# Patient Record
Sex: MALE | HISPANIC OR LATINO | Employment: UNEMPLOYED | URBAN - METROPOLITAN AREA
[De-identification: names, ages, dates, MRNs, and addresses within clinical notes are randomized per-mention and may not be internally consistent; named-entity substitution may affect disease eponyms.]

---

## 2019-06-24 ENCOUNTER — OFFICE VISIT (OUTPATIENT)
Dept: FAMILY MEDICINE CLINIC | Facility: CLINIC | Age: 39
End: 2019-06-24

## 2019-06-24 VITALS
HEART RATE: 76 BPM | TEMPERATURE: 98.3 F | SYSTOLIC BLOOD PRESSURE: 140 MMHG | DIASTOLIC BLOOD PRESSURE: 90 MMHG | HEIGHT: 60 IN | WEIGHT: 183 LBS | BODY MASS INDEX: 35.93 KG/M2

## 2019-06-24 DIAGNOSIS — Z13.31 POSITIVE DEPRESSION SCREENING: ICD-10-CM

## 2019-06-24 DIAGNOSIS — F43.9 STRESS AT HOME: ICD-10-CM

## 2019-06-24 DIAGNOSIS — I10 HYPERTENSION, UNSPECIFIED TYPE: Primary | ICD-10-CM

## 2019-06-24 DIAGNOSIS — R07.9 CHEST PAIN, UNSPECIFIED TYPE: ICD-10-CM

## 2019-06-24 PROCEDURE — 99204 OFFICE O/P NEW MOD 45 MIN: CPT | Performed by: NURSE PRACTITIONER

## 2019-06-24 PROCEDURE — 93000 ELECTROCARDIOGRAM COMPLETE: CPT | Performed by: NURSE PRACTITIONER

## 2019-06-24 RX ORDER — CLONIDINE HYDROCHLORIDE 0.1 MG/1
0.1 TABLET ORAL ONCE
Status: SHIPPED | OUTPATIENT
Start: 2019-06-24

## 2019-06-24 RX ORDER — NITROGLYCERIN 2.5 MG/1
2.5 CAPSULE ORAL 3 TIMES DAILY
Qty: 30 CAPSULE | Refills: 3 | Status: SHIPPED | OUTPATIENT
Start: 2019-06-24

## 2019-06-24 RX ORDER — LISINOPRIL AND HYDROCHLOROTHIAZIDE 12.5; 1 MG/1; MG/1
1 TABLET ORAL DAILY
Qty: 30 TABLET | Refills: 5 | Status: SHIPPED | OUTPATIENT
Start: 2019-06-24

## 2019-06-24 NOTE — PATIENT INSTRUCTIONS
Hipertensión  Hypertension  El término hipertensión es otra forma de denominar a la presión arterial elevada. La presión arterial elevada fuerza al corazón a trabajar más para bombear la tone. [place] puede causar problemas con el paso del tiempo.  Simona lectura de presión arterial está compuesta por 2 números. Hay un número superior (sistólico) sobre un número inferior (diastólico). Lo ideal es tener la presión arterial por debajo de 120/80. Las decisiones saludables pueden ayudarle a disminuir van presión arterial. Es posible que necesite medicamentos que le ayuden a disminuir van presión arterial si:  · Van presión arterial no disminuye mediante decisiones saludables.  · Van presión arterial está por encima de 130/80.    Siga estas instrucciones en van casa:  Comida y bebida  · Si se lo indican, siga el plan de alimentación de DASH (Dietary Approaches to Stop Hypertension, Maneras de alimentarse para detener la hipertensión). Esta dieta incluye:  ? Que la mitad del plato de cada comida sea de frutas y verduras.  ? Que un cuarto del plato de cada comida sea de cereales integrales. Los cereales integrales incluyen pasta integral, arroz integral y pan integral.  ? Cicero y beber productos lácteos con bajo contenido de grasa, william leche descremada o yogur bajo en grasas.  ? Que un cuarto del plato de cada comida sea de proteínas bajas en grasa (magras). Las proteínas bajas en grasa incluyen pescado, vanesa sin piel, huevos, frijoles y tofu.  ? Evitar consumir carne grasa, carne curada y procesada, o vanesa con piel.  ? Evitar consumir alimentos prehechos o procesados.  · Consuma menos de 1500 mg de sal (sodio) por día.  · Limite el consumo de alcohol a no más de 1 medida por día si es ronda y no está embarazada y a 2 medidas por día si es hombre. Simona medida equivale a 12 onzas de cerveza, 5 onzas de vino o 1½ onzas de bebidas alcohólicas de annie graduación.  Estilo de anderson  · Trabaje con van médico para mantenerse en un peso  saludable o para perder peso. Pregúntele a van médico cuál es el peso recomendable para usted.  · Realice al menos 30 minutos de ejercicio que aníbal que se acelere van corazón (ejercicio aeróbico) la mayoría de los días de la semana. Estos pueden incluir caminar, nadar o andar en bicicleta.  · Realice al menos 30 minutos de ejercicio que fortalezca dara músculos (ejercicios de resistencia) al menos 3 días a la semana. Estos pueden incluir levantar pesas o hacer pilates.  · No consuma ningún producto que contenga nicotina o tabaco. [place] incluye cigarrillos y cigarrillos electrónicos. Si necesita ayuda para dejar de fumar, consulte al médico.  · Controle van presión arterial en van casa warren william le indicó el médico.  · Concurra a todas las visitas de control william se lo haya indicado el médico. [place] es importante.  Medicamentos  · [place] los medicamentos de venta kavya y los recetados solamente william se lo haya indicado el médico. Siga cuidadosamente las indicaciones.  · No omita las dosis de medicamentos para la presión arterial. Los medicamentos pierden eficacia si omite dosis. El hecho de omitir las dosis también aumenta el riesgo de otros problemas.  · Pregúntele a van médico a qué efectos secundarios o reacciones a los medicamentos debe prestar atención.  Comuníquese con un médico si:  · Piensa que tiene ricardo reacción a los medicamentos que está tomando.  · Tiene leland de kenyon frecuentes (recurrentes).  · Siente mareos.  · Tiene hinchazón en los tobillos.  · Tiene problemas de visión.  Solicite ayuda de inmediato si:  · Siente un dolor de kenyon muy intenso.  · Comienza a sentirse confundido.  · Se siente débil o adormecido.  · Siente que va a desmayarse.  · Siente un dolor muy intenso en:  ? El pecho.  ? El vientre (abdomen).  · Devuelve (vomita) más de ricardo vez.  · Tiene dificultad para respirar.  Resumen  · El término hipertensión es otra forma de denominar a la presión arterial elevada.  · Las decisiones saludables  pueden ayudarle a disminuir van presión arterial. Si no puede controlar van presión arterial mediante decisiones saludables, es posible que deba darlyn medicamentos.  Esta información no tiene william fin reemplazar el consejo del médico. Asegúrese de hacerle al médico cualquier pregunta que tenga.  Document Released: 06/07/2011 Document Revised: 11/29/2017 Document Reviewed: 11/29/2017  Elsevier Interactive Patient Education © 2019 [employer]

## 2019-06-24 NOTE — PROGRESS NOTES
Stefano Foote presents today to establish care.    Chief Complaint   Patient presents with   • Establish Care     New patient he comes in today to establish care and elavated blood pressure.        HPI   Stefano presents today to establish care.  Concerns of high blood pressure.   at bedside.    Hypertension  Chronic.  Worsening.  States that he was diagnosed with high blood pressure many years ago in [place].  Was taking medication for his blood pressure but then moved to the [facility] and has not had his medication for over a year.  Does not remember the name of this medication.  Does not have a way to check blood pressure at home.  Goes to [facility] to check blood pressure intermittently.  Blood pressure readings have been 200/120 at the highest, and 140/90 at the lowest which is his normal baseline without medications.  He can usually tell when his blood pressure is high; symptoms include chest pain, headache, and increased fatigue.    Chest pain  New.  Stable.  Has been having chest pain radiating to his left arm for over 1 week.  Describes as pressure that feels like a squeezing pain.  Associated symptoms include headache, pounding.  Rates pain 5/10, constant.  Relieved by rest and decreasing anxiety.    Positive depression screening  Denies any concerns of anxiety or depression.  States that he is under a lot of stress at home.  He is away from his family who are still in [place].  Is having to keep up with family relationships long distance, is not working currently, and has financial concerns.        PHQ-2/PHQ-9 Depression Screening 6/24/2019   Little interest or pleasure in doing things 3   Feeling down, depressed, or hopeless 3   Trouble falling or staying asleep, or sleeping too much 0   Feeling tired or having little energy 1   Poor appetite or overeating 2   Feeling bad about yourself - or that you are a failure or have let yourself or your family down 1   Trouble concentrating on things, such as  reading the newspaper or watching television 1   Moving or speaking so slowly that other people could have noticed. Or the opposite - being so fidgety or restless that you have been moving around a lot more than usual 2   Thoughts that you would be better off dead, or of hurting yourself in some way 0   Total Score 13   If you checked off any problems, how difficult have these problems made it for you to do your work, take care of things at home, or get along with other people? Somewhat difficult       Review of Systems   Constitutional: Positive for fatigue. Negative for activity change, unexpected weight gain and unexpected weight loss.   HENT: Negative for congestion.    Eyes: Positive for blurred vision. Negative for visual disturbance.   Respiratory: Positive for chest tightness. Negative for shortness of breath and wheezing.    Cardiovascular: Positive for chest pain. Negative for palpitations and leg swelling.   Gastrointestinal: Negative for constipation, diarrhea, nausea, vomiting and GERD.   Musculoskeletal: Positive for arthralgias (left arm pain).   Neurological: Positive for headache. Negative for dizziness, light-headedness and confusion.   Psychiatric/Behavioral: Positive for stress. Negative for depressed mood. The patient is nervous/anxious.    All other systems reviewed and are negative.       Past Medical History:   Diagnosis Date   • Hypertension         History reviewed. No pertinent surgical history.     History reviewed. No pertinent family history.     Social History     Socioeconomic History   • Marital status:      Spouse name: Not on file   • Number of children: Not on file   • Years of education: Not on file   • Highest education level: Not on file   Tobacco Use   • Smoking status: Never Smoker   • Smokeless tobacco: Never Used   Substance and Sexual Activity   • Alcohol use: No     Frequency: Never     Comment: former quit 1 year ago   • Drug use: No        No current outpatient  "medications on file prior to visit.     No current facility-administered medications on file prior to visit.        No Known Allergies     Visit Vitals  /90   Pulse 76   Temp 98.3 °F (36.8 °C)   Ht 151.1 cm (59.5\")   Wt 83 kg (183 lb)   BMI 36.34 kg/m²        Physical Exam   Constitutional: He is oriented to person, place, and time. Vital signs are normal. He appears well-developed and well-nourished.   HENT:   Head: Normocephalic.   Right Ear: Hearing, tympanic membrane, external ear and ear canal normal.   Left Ear: Hearing, tympanic membrane, external ear and ear canal normal.   Nose: Nose normal.   Mouth/Throat: Oropharynx is clear and moist.   Eyes: Conjunctivae and lids are normal. Pupils are equal, round, and reactive to light.   Neck: No JVD present. Carotid bruit is not present. No thyromegaly present.   Cardiovascular: Normal rate, regular rhythm, normal heart sounds and intact distal pulses.   Pulmonary/Chest: Effort normal and breath sounds normal.   Abdominal: Soft. Bowel sounds are normal.   Musculoskeletal: Normal range of motion.        Left shoulder: He exhibits pain. He exhibits normal range of motion and no tenderness.   Lymphadenopathy:        Head (right side): No submental, no submandibular, no tonsillar, no preauricular, no posterior auricular and no occipital adenopathy present.        Head (left side): No submental, no submandibular, no tonsillar, no preauricular, no posterior auricular and no occipital adenopathy present.     He has no cervical adenopathy.   Neurological: He is alert and oriented to person, place, and time. He has normal strength. No cranial nerve deficit or sensory deficit. GCS eye subscore is 4. GCS verbal subscore is 5. GCS motor subscore is 6.   Skin: Skin is warm, dry and intact.   Psychiatric: He has a normal mood and affect. His speech is normal and behavior is normal. Judgment and thought content normal.   Nursing note and vitals reviewed.       ECG 12 " Lead  Date/Time: 6/24/2019 2:21 PM  Performed by: Myriam Thompson APRN  Authorized by: Myriam Thompson APRN   Comparison: not compared with previous ECG   Rhythm: sinus rhythm  Rate: normal  Conduction: conduction normal  ST Segments: ST segments normal  T Waves: T waves normal  QRS axis: normal  Other: no other findings    Clinical impression: normal ECG          No results found for this or any previous visit.     Assessment/Plan  Stefano was seen today for establish care.    Diagnoses and all orders for this visit:    Hypertension, unspecified type  -     lisinopril-hydrochlorothiazide (PRINZIDE,ZESTORETIC) 10-12.5 MG per tablet; Take 1 tablet by mouth Daily.  -     CloNIDine (CATAPRES) tablet 0.1 mg  -Instructed Stefano to keep log of blood pressures at home over the next 2 weeks.  -Educated on DASH diet and implementing an exercise regimen.  -Instructed to seek emergent treatment if chest pain or tightness, diaphoresis, headache with visual changes, shortness of breath, or change in mental status occur.    Chest pain, unspecified type  -     ECG 12 Lead-normal sinus rhythm  -     nitroglycerin (NITRO-BID) 2.5 MG CR capsule; Take 1 capsule by mouth 3 (Three) Times a Day.    Positive depression screening  Stress at home  -Declined any resources for counseling or medications for depression at this time.    -Declined all lab work and did not want to seek emergent treatment.  He is concerned about finances and does not feel that labs would be beneficial.  Thinks that chest pain is just from stress.  I strongly encouraged him to complete lab work and seek emergent treatment if chest pain or tightness persist.  He verbalized understanding.    Return in about 1 week (around 7/1/2019) for Follow-up, Chronic care and labs.